# Patient Record
Sex: MALE | Race: WHITE
[De-identification: names, ages, dates, MRNs, and addresses within clinical notes are randomized per-mention and may not be internally consistent; named-entity substitution may affect disease eponyms.]

---

## 2019-07-20 ENCOUNTER — HOSPITAL ENCOUNTER (EMERGENCY)
Dept: HOSPITAL 89 - ER | Age: 49
Discharge: HOME | End: 2019-07-20
Payer: OTHER GOVERNMENT

## 2019-07-20 VITALS — SYSTOLIC BLOOD PRESSURE: 108 MMHG | DIASTOLIC BLOOD PRESSURE: 74 MMHG

## 2019-07-20 DIAGNOSIS — R07.9: Primary | ICD-10-CM

## 2019-07-20 LAB — PLATELET COUNT, AUTOMATED: 275 K/UL (ref 150–450)

## 2019-07-20 PROCEDURE — 84460 ALANINE AMINO (ALT) (SGPT): CPT

## 2019-07-20 PROCEDURE — 85025 COMPLETE CBC W/AUTO DIFF WBC: CPT

## 2019-07-20 PROCEDURE — 82435 ASSAY OF BLOOD CHLORIDE: CPT

## 2019-07-20 PROCEDURE — 84075 ASSAY ALKALINE PHOSPHATASE: CPT

## 2019-07-20 PROCEDURE — 99283 EMERGENCY DEPT VISIT LOW MDM: CPT

## 2019-07-20 PROCEDURE — 71046 X-RAY EXAM CHEST 2 VIEWS: CPT

## 2019-07-20 PROCEDURE — 84132 ASSAY OF SERUM POTASSIUM: CPT

## 2019-07-20 PROCEDURE — 82374 ASSAY BLOOD CARBON DIOXIDE: CPT

## 2019-07-20 PROCEDURE — 84450 TRANSFERASE (AST) (SGOT): CPT

## 2019-07-20 PROCEDURE — 82040 ASSAY OF SERUM ALBUMIN: CPT

## 2019-07-20 PROCEDURE — 82247 BILIRUBIN TOTAL: CPT

## 2019-07-20 PROCEDURE — 93005 ELECTROCARDIOGRAM TRACING: CPT

## 2019-07-20 PROCEDURE — 84295 ASSAY OF SERUM SODIUM: CPT

## 2019-07-20 PROCEDURE — 82947 ASSAY GLUCOSE BLOOD QUANT: CPT

## 2019-07-20 PROCEDURE — 82565 ASSAY OF CREATININE: CPT

## 2019-07-20 PROCEDURE — 84520 ASSAY OF UREA NITROGEN: CPT

## 2019-07-20 PROCEDURE — 84484 ASSAY OF TROPONIN QUANT: CPT

## 2019-07-20 PROCEDURE — 84155 ASSAY OF PROTEIN SERUM: CPT

## 2019-07-20 PROCEDURE — 82310 ASSAY OF CALCIUM: CPT

## 2019-07-20 NOTE — EKG
FACILITY: [facility] 

 

PATIENT NAME: SHAKIRA LEMUS

: [id number]

MR: [id number]

V: [id number]

EXAM DATE: 

ORDERING PHYSICIAN: JESUS FLORES

TECHNOLOGIST: 

 

Test Reason : chest pain

Blood Pressure : ***/*** mmHG

Vent. Rate : 069 BPM     Atrial Rate : 069 BPM

   P-R Int : 120 ms          QRS Dur : 092 ms

    QT Int : 394 ms       P-R-T Axes : 016 -10 028 degrees

   QTc Int : 422 ms

 

Normal sinus rhythm

Normal ECG

No previous ECGs available

Confirmed by ROHIT VILLALBA ([id number]) on 2019 6:24:19 AM

 

Referred By:             Confirmed By:ROHIT VILLALBA

## 2019-07-20 NOTE — RADIOLOGY IMAGING REPORT
FACILITY: [facility] 

 

PATIENT NAME: Aba Elena

: 1970

MR: [id number]

V: [id number]

EXAM DATE: 

ORDERING PHYSICIAN: JESUS FLORES

TECHNOLOGIST: 

 

Location: [facility]

Patient: Aba Elena

: 1970

MRN: [id number]

Visit/Account:[id number]

Date of Sevice:  2019

 

ACCESSION #: [id number]

 

CHEST PA LAT

 

HISTORY: Chest pain. Shortness of breath.

 

COMPARISON: None

 

FINDINGS:

 

Cardiomediastinal contours: The heart size is normal.

Lungs and pleura: There is no finding of an infiltrate, lymphadenopathy or pleural effusion.

Bones/soft tissues: There are no findings of a fracture.

 

 

IMPRESSION:

Normal chest x-ray without findings of acute disease.

 

 

Report Dictated By: Hiren Felton MD at 2019 4:43 PM

 

Report E-Signed By: Hiren Felton MD  at 2019 4:44 PM

 

WSN:[id number]

## 2019-07-20 NOTE — ER REPORT
History and Physical


Time Seen By MD:  15:46


Hx. of Stated Complaint:  


chest pain started about 14:15 today. almost drove off the road when it started.





brought in by [facility]





325 asa





1 ntg


HPI/ROS


CHIEF COMPLAINT: chest pain





HISTORY OF PRESENT ILLNESS: 48 year old male presents with chest pain. Reports a


heaviness in his chest that started at about 1300 today and quickly progressed 


to chest pain. He is a  and was driving [address] between [place] and 


[place] when the chest pain started. Reports pain was located in left upper 


chest. Reports he had associated symptoms of headache, dizziness, and nausea. He


pulled over to the side of the road and called 911. EMTs reports he had a 


systolic BP in the 180's. He was given nitro and 324mg of Aspirin. He currently 


reports he no longer has chest pain, headache, dizziness, or nausea. He reports 


he has hx of angina. He went to the ED about 6 months ago for significant chest 


pain. Reports he went to a hospital in [place] where they gave him nitro. He 


reports he has since taken all of the nitro he was prescribed. Denies a known 


history of CVD. He also reports a hx of PTSD and anxiety. 





REVIEW OF SYSTEMS:


Constitutional: No fever


Respiratory: No cough, no dyspnea.


Cardiovascular: No chest pain, no palpitations.


Gastrointestinal: No vomiting, no abdominal pain.


Musculoskeletal: No back pain.


Allergies:  


Coded Allergies:  


     No Known Drug Allergies (Unverified , 19)


Home Meds


Reported Medications


Duloxetine HCl (Duloxetine HCl) 60 Mg Capsule.dr, 40 MG DAILY


   19


Aripiprazole (ABILIFY) 10 Mg Tablet, 10 MG PO QDAY, TAB


   19


Carbamazepine (TEGRETOL XR) 400 Mg Tab.er.12h, 400 MG PO


   19


Gabapentin (GABAPENTIN) 300 Mg Capsule, 800 MG PO TID, CAPSULE


   19


Past Medical/Surgical History


Past medical hx of angina, GERD, PTSD, bipolar disorder, anxiety, bilateral 


carpal tunnel.


No past surgical hx.


Reviewed Nurses Notes:  Yes


Constitutional





Vital Sign - Last 24 Hours








 19





 15:41 15:43 15:45 15:56


 


Temp  98.7  


 


Pulse 60 61  66


 


Resp  14  75


 


B/P (MAP) 117/78 (91) 117/78 122/70 (87) 


 


Pulse Ox 92 92  94


 


O2 Delivery  Room Air  


 


    





 19





 16:00 16:11 16:15 16:26


 


Pulse  69  ???


 


Resp  7  


 


B/P (MAP) 114/86 (95)  112/89 (97) 


 


Pulse Ox  94  





 19





 16:30 16:37 16:41 16:45


 


Pulse   70 


 


Resp   20 


 


B/P (MAP) 126/83 (97) 128/90 (103)  143/85 (104)


 


Pulse Ox   93 





 19





 16:56 17:00 17:11 17:15


 


Pulse 77  74 


 


Resp 12  15 


 


B/P (MAP)  125/91 (102)  114/93 (100)


 


Pulse Ox 93  93 





 19





 17:26 17:30 17:35 17:45


 


Pulse 84  80 


 


Resp 19  22 


 


B/P (MAP)  128/86 (100)  112/98 (103)


 


Pulse Ox 93  94 





 19





 17:50 18:00 18:05 18:15


 


Pulse 82  77 


 


Resp 30  34 


 


B/P (MAP)  128/87 (101)  121/83 (96)


 


Pulse Ox 94  94 





 19 





 18:20 18:30 18:35 


 


Pulse 90  85 


 


Resp 15  15 


 


B/P (MAP)  108/74 (85)  


 


Pulse Ox 91  93 








Physical Exam


General Appearance: The patient is alert, has no immediate need for airway 


protection and no current signs of toxicity.  


Eyes: Pupils equal and round no injection.


Respiratory: Chest is non tender, lungs are clear to auscultation.


Cardiac: regular rate and rhythm 


Gastrointestinal: Abdomen is soft and non tender, no masses, bowel sounds 


normal.


Musculoskeletal:  Neck: Neck is supple and non tender.


   Extremities have full range of motion and are non tender. Tenderness to 


palpation in left pectoral muscle.


Skin: No rashes or lesions.





DIFFERENTIAL DIAGNOSIS: After history and physical exam differential diagnosis 


was considered for MI, pneumonia, anxiety.





Medical Decision Making


Data Points


Result Diagram:  


19 1538                                                                    


           19 1538





Laboratory





Hematology








Test


 19


15:38


 


White Blood Count


 5.9 k/uL


(4.5-11.0)


 


Red Blood Count


 5.40 M/uL


(4.00-5.60)


 


Hemoglobin


 16.5 g/dL


(14.0-18.0)


 


Hematocrit


 48.0 %


(42.0-52.0)


 


Mean Corpuscular Volume


 88.8 fL


(80.0-96.0)


 


Mean Corpuscular Hemoglobin


 30.5 pg


(26.0-33.0)


 


Mean Corpuscular Hemoglobin


Concent 34.3 g/dL


(32.0-36.0)


 


Red Cell Distribution Width


 14.4 %


(11.5-14.5)


 


Platelet Count


 275 K/uL


(150-450)


 


Mean Platelet Volume


 8.6 fL


(7.2-11.1)


 


Neutrophils (%) (Auto)


 60.7 %


(39.4-72.5)


 


Lymphocytes (%) (Auto)


 27.5 %


(17.6-49.6)


 


Monocytes (%) (Auto)


 6.9 %


(4.1-12.4)


 


Eosinophils (%) (Auto)


 4.3 %


(0.4-6.7)


 


Basophils (%) (Auto)


 0.6 %


(0.3-1.4)


 


Nucleated RBC Relative Count


(auto) 0.5 /100WBC  





 


Neutrophils # (Auto)


 3.6 K/uL


(2.0-7.4)


 


Lymphocytes # (Auto)


 1.6 K/uL


(1.3-3.6)


 


Monocytes # (Auto)


 0.4 K/uL


(0.3-1.0)


 


Eosinophils # (Auto)


 0.3 K/uL


(0.0-0.5)


 


Basophils # (Auto)


 0.0 K/uL


(0.0-0.1)


 


Nucleated RBC Absolute Count


(auto) 0.03 K/uL  











Chemistry








Test


 19


15:38 19


17:40


 


Sodium Level


 140 mmol/L


(137-145) 





 


Potassium Level


 4.2 mmol/L


(3.5-5.0) 





 


Chloride Level


 106 mmol/L


() 





 


Carbon Dioxide Level


 23 mmol/L


(22-30) 





 


Blood Urea Nitrogen


 13 mg/dl


(9-21) 





 


Creatinine


 0.80 mg/dl


(0.66-1.25) 





 


Glomerular Filtration Rate


Calc > 60.0 


 





 


Random Glucose


 95 mg/dl


() 





 


Calcium Level


 9.4 mg/dl


(8.4-10.2) 





 


Total Bilirubin


 0.3 mg/dl


(0.2-1.3) 





 


Aspartate Amino Transf


(AST/SGOT) 26 U/L (0-35) 


 





 


Alanine Aminotransferase


(ALT/SGPT) 48 U/L (0-56) 


 





 


Alkaline Phosphatase 96 U/L (0-126)  


 


Total Protein


 7.0 g/dl


(6.3-8.2) 





 


Albumin


 4.0 g/dl


(3.5-5.0) 





 


Troponin I  < 0.012 ng/ml 











EKG/Imaging


EKG Interpretation


12 lead EKG:


      Rhythm: normal sinus rhythm of ventricular rate of 69


      Axis: normal 


      QRS: normal


      ST segments: normal


Monitor Interpretation:  Normal Sinus Rhythm


Imaging


PATIENT NAME: Aba Elena


: 1970


MR: [id number]


V: [id number]


EXAM DATE: 


ORDERING PHYSICIAN: JESUS FLORES


TECHNOLOGIST: 


 


Location: [facility]


Patient: Aba Elena


: 1970


MRN: [id number]


Visit/Account:[id number]


Date of Sevice:  2019


 


ACCESSION #: [id number]


 


CHEST PA LAT


 


HISTORY: Chest pain. Shortness of breath.


 


COMPARISON: None


 


FINDINGS:


 


Cardiomediastinal contours: The heart size is normal.


Lungs and pleura: There is no finding of an infiltrate, lymphadenopathy or 


pleural effusion.


Bones/soft tissues: There are no findings of a fracture.


 


 


IMPRESSION:


Normal chest x-ray without findings of acute disease.





ED Course/Re-evaluation


ED Course


Upon arrival to the ED, patient admitted to an exam room, hx and physical 


obtained, differentials considered. Patient presents with chest pain. Reports a 


heaviness in his chest that started at about 1300 today and quickly progressed 


to chest pain. He is a  and was driving [address] between [place] and 


[place] when the chest pain started. Reports pain was located in left upper 


chest. Reports he had associated symptoms of headache, dizziness, and nausea. He


pulled over to the side of the road and called 911. EMTs report he had a 


systolic BP in the 180's. He was given nitro and 324mg of Aspirin. He currently 


reports he no longer has chest pain, headache, dizziness, or nausea. He reports 


he has hx of angina. He went to the ED about 6 months ago for significant chest 


pain. Reports he went to a hospital in [place] where they gave him nitro. He 


reports he has since taken all of the nitro he was prescribed. Denies a known 


history of CVD. He also reports a hx of PTSD and anxiety. He smokes 1 pack of 


cigarettes a day. On exam, heart rate and rhythm regular. Lungs clear to 


auscultation. He does have tenderness in left pectoral muscle. IV started. CBC, 


CMP, troponin, EKG, chest x-ray obtained. CBC, CMP benign. Troponin negative. 


EKG showed normal sinus rhythm with ventricular rate of 69. Chest x-ray with no 


signs of acute cardiopulmonary process. Troponin was repeated 1 hour later and 


was negative. Chest pain was likely due to his stress and anxiety. Patient 


agrees with this. Will discharge patient home. He is in agreement with plan of 


care.


Decision to Disposition Date:  2019


Decision to Disposition Time:  18:35





Depart


Departure


Latest Vital Signs





Vital Signs








  Date Time  Temp Pulse Resp B/P (MAP) Pulse Ox O2 Delivery O2 Flow Rate FiO2


 


19 18:35  85 15  93   


 


19 18:30    108/74 (85)    


 


19 15:43 98.7     Room Air  








Impression:  


   Primary Impression:  


   Chest pain


Condition:  Improved


Disposition:  HOME OR SELF-CARE


Patient Instructions:  Chest Pain (ED)





Additional Instructions:  


Drink plenty of water and get plenty of rest.


Get plenty of sleep tonight.


Continue current medications as prescribed.


Follow-up with your primary care provider as soon as you get back home.


Return to the ER if you experience chest pain, any difficulty breathing, 


vomiting, or for any other concern.





Problem Qualifiers








   Primary Impression:  


   Chest pain


   Chest pain type:  unspecified  Qualified Codes:  R07.9 - Chest pain, 


   unspecified








JESUS FLORES                2019 15:52